# Patient Record
Sex: MALE | Race: WHITE | URBAN - METROPOLITAN AREA
[De-identification: names, ages, dates, MRNs, and addresses within clinical notes are randomized per-mention and may not be internally consistent; named-entity substitution may affect disease eponyms.]

---

## 2021-07-14 ENCOUNTER — HISTORICAL (OUTPATIENT)
Dept: RADIOLOGY | Facility: HOSPITAL | Age: 76
End: 2021-07-14

## 2021-10-20 ENCOUNTER — HISTORICAL (OUTPATIENT)
Dept: ADMINISTRATIVE | Facility: HOSPITAL | Age: 76
End: 2021-10-20

## 2021-10-20 LAB
ALBUMIN SERPL-MCNC: 4 GM/DL (ref 3.4–4.8)
ALBUMIN/GLOB SERPL: 1 RATIO (ref 1.1–2)
ALP SERPL-CCNC: 74 UNIT/L (ref 40–150)
ALT SERPL-CCNC: 25 UNIT/L (ref 0–55)
AST SERPL-CCNC: 19 UNIT/L (ref 5–34)
BILIRUB SERPL-MCNC: 0.3 MG/DL
BILIRUBIN DIRECT+TOT PNL SERPL-MCNC: 0.1 MG/DL (ref 0–0.8)
BILIRUBIN DIRECT+TOT PNL SERPL-MCNC: 0.2 MG/DL (ref 0–0.5)
BUN SERPL-MCNC: 33.1 MG/DL (ref 8.4–25.7)
CALCIUM SERPL-MCNC: 10 MG/DL (ref 8.7–10.5)
CHLORIDE SERPL-SCNC: 103 MMOL/L (ref 98–107)
CO2 SERPL-SCNC: 27 MMOL/L (ref 23–31)
CREAT SERPL-MCNC: 1.55 MG/DL (ref 0.73–1.18)
ERYTHROCYTE [DISTWIDTH] IN BLOOD BY AUTOMATED COUNT: 21.1 % (ref 11.5–17)
GLOBULIN SER-MCNC: 4.1 GM/DL (ref 2.4–3.5)
GLUCOSE SERPL-MCNC: 129 MG/DL (ref 82–115)
HCT VFR BLD AUTO: 43.5 % (ref 42–52)
HGB BLD-MCNC: 13.8 GM/DL (ref 14–18)
MCH RBC QN AUTO: 26.7 PG (ref 27–31)
MCHC RBC AUTO-ENTMCNC: 31.7 GM/DL (ref 33–36)
MCV RBC AUTO: 84.1 FL (ref 80–94)
PLATELET # BLD AUTO: 294 X10(3)/MCL (ref 130–400)
PMV BLD AUTO: 9.9 FL (ref 9.4–12.4)
POTASSIUM SERPL-SCNC: 4.2 MMOL/L (ref 3.5–5.1)
PROT SERPL-MCNC: 8.1 GM/DL (ref 5.8–7.6)
RBC # BLD AUTO: 5.17 X10(6)/MCL (ref 4.7–6.1)
SODIUM SERPL-SCNC: 139 MMOL/L (ref 136–145)
WBC # SPEC AUTO: 9.1 X10(3)/MCL (ref 4.5–11.5)

## 2021-11-01 ENCOUNTER — HISTORICAL (OUTPATIENT)
Dept: ADMINISTRATIVE | Facility: HOSPITAL | Age: 76
End: 2021-11-01

## 2021-11-01 LAB — SARS-COV-2 AG RESP QL IA.RAPID: NEGATIVE

## 2021-11-02 ENCOUNTER — HOSPITAL ENCOUNTER (OUTPATIENT)
Dept: MEDSURG UNIT | Facility: HOSPITAL | Age: 76
End: 2021-11-03
Attending: SURGERY | Admitting: SURGERY

## 2021-11-03 LAB
ABS NEUT (OLG): 8.53 X10(3)/MCL (ref 2.1–9.2)
BASOPHILS # BLD AUTO: 0 X10(3)/MCL (ref 0–0.2)
BASOPHILS NFR BLD AUTO: 0 %
BUN SERPL-MCNC: 21.7 MG/DL (ref 8.4–25.7)
CALCIUM SERPL-MCNC: 9.1 MG/DL (ref 8.7–10.5)
CHLORIDE SERPL-SCNC: 105 MMOL/L (ref 98–107)
CO2 SERPL-SCNC: 25 MMOL/L (ref 23–31)
CREAT SERPL-MCNC: 1.34 MG/DL (ref 0.73–1.18)
CREAT/UREA NIT SERPL: 16
EOSINOPHIL # BLD AUTO: 0 X10(3)/MCL (ref 0–0.9)
EOSINOPHIL NFR BLD AUTO: 0 %
ERYTHROCYTE [DISTWIDTH] IN BLOOD BY AUTOMATED COUNT: 21.2 % (ref 11.5–17)
GLUCOSE SERPL-MCNC: 129 MG/DL (ref 82–115)
HCT VFR BLD AUTO: 38.4 % (ref 42–52)
HGB BLD-MCNC: 12.6 GM/DL (ref 14–18)
LYMPHOCYTES # BLD AUTO: 1.2 X10(3)/MCL (ref 0.6–4.6)
LYMPHOCYTES NFR BLD AUTO: 11 %
MCH RBC QN AUTO: 27.3 PG (ref 27–31)
MCHC RBC AUTO-ENTMCNC: 32.8 GM/DL (ref 33–36)
MCV RBC AUTO: 83.1 FL (ref 80–94)
MONOCYTES # BLD AUTO: 0.6 X10(3)/MCL (ref 0.1–1.3)
MONOCYTES NFR BLD AUTO: 6 %
NEUTROPHILS # BLD AUTO: 8.53 X10(3)/MCL (ref 2.1–9.2)
NEUTROPHILS NFR BLD AUTO: 82 %
PLATELET # BLD AUTO: 257 X10(3)/MCL (ref 130–400)
PMV BLD AUTO: 10.1 FL (ref 9.4–12.4)
POTASSIUM SERPL-SCNC: 4.2 MMOL/L (ref 3.5–5.1)
RBC # BLD AUTO: 4.62 X10(6)/MCL (ref 4.7–6.1)
SODIUM SERPL-SCNC: 139 MMOL/L (ref 136–145)
WBC # SPEC AUTO: 10.4 X10(3)/MCL (ref 4.5–11.5)

## 2022-04-09 ENCOUNTER — HISTORICAL (OUTPATIENT)
Dept: ADMINISTRATIVE | Facility: HOSPITAL | Age: 77
End: 2022-04-09

## 2022-04-26 VITALS
HEIGHT: 70 IN | BODY MASS INDEX: 26.1 KG/M2 | DIASTOLIC BLOOD PRESSURE: 76 MMHG | SYSTOLIC BLOOD PRESSURE: 130 MMHG | WEIGHT: 182.31 LBS

## 2022-04-30 NOTE — OP NOTE
Patient:   Ian Leija            MRN: [id number]            FIN: [id number]               Age:   76 years     Sex:  Male     :  1945   Associated Diagnoses:   None   Author:   Leland Barnes MD            DATE OF SURGERY:    21    SURGEON:  Leland Barnes MD  ASSISTANT:   Fidel Odell MD    PREOPERATIVE DIAGNOSIS:  Gastroesophageal Reflux Disease    POSTOPERATIVE DIAGNOSIS:  Same.    OPERATIONS:  Laparoscopic Luiz Fundoplication with Hiatal Repair    Anesthesia:  General endotracheal anesthesia.   Estimated blood loss:  Less than 50 cc.   Blood administered:  None.   Lap and instrument counts correct x 2 at the end of the case.  Specimen: None    INDICATIONS/SIGNIFICANT HISTORY:  The patient is a 76 year old female who seen with complaints of recurrent gastroesophageal reflux.  Pt has tried non-surgical therapy but was continued reflux symptoms.  Risks and Benefits of Luiz Fundoplication was discussed with the patient who voiced understanding of risks / benefits and elected to proceed with surgery.        PROCEDURE IN DETAIL:  Once informed consents were obtained, the patient was taken to the operating room and placed supine on the operating table.  After general endotracheal anesthesia was induced, the abdomen was prepped and draped in a standard surgical fashion.  A juventino-umbilical incision was made and a 11mm visiport trocar was used to enter the abdominal cavity under direct visualization.  Pneumoperitoneum was created with insufflation under direct visualization.  Two working trocar ports were placed in the right upper quadrant along with an assistant 5mm port placed in the left upper quadrant.  A subcostal incision was made and the Justin liver retractor placed under direct visualization.  The patient appeared to have a moderate hiatal hernia appreciated.  Attention was directed toward the right randy upon which the gastrohepatic ligament was taken down until the right randy  identified.  The peritoneum was carefully dissected off the right randy of the diaphragm inferior to superiorly.  The esophagus was identified  and the anterior vagus nerve identified and spared appropriately.  The anterior hernia sac was dissected appropriately.  Attention was then directed to the stomach and the fundus was mobilized with transection of the short gastrics vessels using the harmonic scalpel.  A vessel loop was then placed around the esophagus and used for retraction.  The esophagus was adequately mobilized to obtain approximately 5cm of intra-abdominal esophagus.  A 52 F Bougie was then placed.  The Hiatus was adequately repaired with 0 Ethibond sutures.  The Fundus of the stomach was brought around through the retro-esophageal window in appropriate orientation.  The shoe-shine maneuver was performed and the wrap appeared floppy.  The wrap was completed with fundo-fundic sutures using 0 Ethibond to achieve at least 3cm  anteriorly.  The Bougie was removed and the wrap once again visualized for appropriate orientation and hemostasis.  The liver retractor was removed.  All ports were removed under direct visualization with no active bleeding noted.  All incisions were closed with a 4-0 Vicryl stitch.  The wounds were cleaned and dried and steri-strips were applied.  The patient tolerated the procedure well and was transported to recovery room in good condition.

## 2022-04-30 NOTE — DISCHARGE SUMMARY
Patient:   Ian Leija            MRN: [id number]            FIN: [id number]               Age:   76 years     Sex:  Male     :  1945   Associated Diagnoses:   None   Author:   Leland Barnes MD          DISCHARGE DATE:  11/3/21    ADMISSION DIAGNOSIS:  GERD.    DISCHARGE DIAGNOSIS:  GERD.    OPERATION:  Laparoscopic nissen fundoplication.    HOSPITAL COURSE:  The patient is a 76-year-old male who underwent a laparoscopic nissen fundoplication for gerd.  On postoperative day number one, the patient's nausea has started to subside.  He was started on a clear liquid diet.  When the patient was tolerating the fluid for diet with good urination, he was discharged to home in good condition.    DISCHARGE MEDICATIONS:  The patient may resume her home medications.  He was discharged on Ultram, q 4 hours prn pain.  Protonix 40 mg 1 po daily, and anti medics as ordered.    DISCHARGE INSTRUCTIONS:  Activity, no heavy lifting greater than 20 lbs for six weeks.  Wounds, patient may shower, no tub baths for two weeks.  Patient is to follow up with Dr. Leland Barnes in two weeks.